# Patient Record
Sex: MALE | Race: BLACK OR AFRICAN AMERICAN | NOT HISPANIC OR LATINO | Employment: UNEMPLOYED | URBAN - METROPOLITAN AREA
[De-identification: names, ages, dates, MRNs, and addresses within clinical notes are randomized per-mention and may not be internally consistent; named-entity substitution may affect disease eponyms.]

---

## 2020-09-29 PROBLEM — M54.30 SCIATICA: Status: ACTIVE | Noted: 2020-09-29

## 2020-09-29 PROBLEM — F14.10 COCAINE ABUSE: Status: ACTIVE | Noted: 2019-11-13

## 2020-09-29 PROBLEM — Z87.39 HISTORY OF LOW BACK PAIN: Status: ACTIVE | Noted: 2020-09-29

## 2020-09-29 PROBLEM — M54.12 CERVICAL RADICULOPATHY: Status: ACTIVE | Noted: 2020-09-29

## 2020-09-29 PROBLEM — Z86.79 HISTORY OF HYPERTENSION: Status: ACTIVE | Noted: 2020-09-29

## 2020-09-29 PROBLEM — R55 SYNCOPE: Status: ACTIVE | Noted: 2019-11-13

## 2020-09-29 PROBLEM — F12.10 CANNABIS ABUSE: Status: ACTIVE | Noted: 2020-09-29

## 2021-03-01 PROBLEM — I10 HYPERTENSION: Status: ACTIVE | Noted: 2021-03-01

## 2021-03-01 PROBLEM — R56.9 SEIZURES: Status: ACTIVE | Noted: 2021-03-01

## 2022-03-16 PROBLEM — F19.10 POLYSUBSTANCE ABUSE: Status: ACTIVE | Noted: 2022-03-16

## 2023-04-27 ENCOUNTER — PATIENT OUTREACH (OUTPATIENT)
Dept: ADMINISTRATIVE | Facility: OTHER | Age: 58
End: 2023-04-27

## 2023-04-27 NOTE — PROGRESS NOTES
"CHW - Initial Contact    This Community Health Worker completed the Social Determinant of Health questionnaire with patient during clinic visit today.    Pt identified barriers of most importance are: Food and shelter; Pt is currently homeless and has been sleeping with friends in the neighborhood from time to time. He also has 6 daughters, but does not stay with them often due to them "having too many issues". Pt also has been recently put back on food stamps, but the amount has been decreased due to him not having a stable address. Pt says he's willing to go to a shelter if needed, but every time he's been they've been full. CHW informed pt that resources are limited and that the only thing that could be offered would be nearby food pantries and [email or web address] [facility] [email or web address].   Referrals to community agencies completed with patient/caregiver consent outside of [facility] include: [facility] Pant[facility] by [facility], [facility], and [facility]   Referrals were put through [facility] - no  Support and Services: n/a  Follow up required: Yes  Follow-up Outreach - Due: 5/17/2023    "

## 2023-04-30 PROBLEM — Z23 PNEUMOCOCCAL VACCINATION ADMINISTERED AT CURRENT VISIT: Status: ACTIVE | Noted: 2023-04-30

## 2023-04-30 PROBLEM — Z23 NEED FOR SHINGLES VACCINE: Status: ACTIVE | Noted: 2023-04-30

## 2023-04-30 PROBLEM — Z23 INFLUENZA VACCINATION ADMINISTERED AT CURRENT VISIT: Status: ACTIVE | Noted: 2023-04-30

## 2023-04-30 PROBLEM — Z00.00 HEALTHCARE MAINTENANCE: Status: ACTIVE | Noted: 2023-04-30

## 2023-04-30 PROBLEM — Z12.11 COLON CANCER SCREENING: Status: ACTIVE | Noted: 2023-04-30

## 2023-05-31 ENCOUNTER — PATIENT OUTREACH (OUTPATIENT)
Dept: ADMINISTRATIVE | Facility: OTHER | Age: 58
End: 2023-05-31

## 2023-07-31 PROBLEM — Z00.00 HEALTHCARE MAINTENANCE: Status: RESOLVED | Noted: 2023-04-30 | Resolved: 2023-07-31
